# Patient Record
Sex: FEMALE | Race: WHITE | NOT HISPANIC OR LATINO
[De-identification: names, ages, dates, MRNs, and addresses within clinical notes are randomized per-mention and may not be internally consistent; named-entity substitution may affect disease eponyms.]

---

## 2021-03-25 PROBLEM — Z00.00 ENCOUNTER FOR PREVENTIVE HEALTH EXAMINATION: Status: ACTIVE | Noted: 2021-03-25

## 2022-08-03 ENCOUNTER — RESULT REVIEW (OUTPATIENT)
Age: 52
End: 2022-08-03

## 2022-09-16 ENCOUNTER — APPOINTMENT (OUTPATIENT)
Dept: PULMONOLOGY | Facility: CLINIC | Age: 52
End: 2022-09-16

## 2022-09-16 ENCOUNTER — NON-APPOINTMENT (OUTPATIENT)
Age: 52
End: 2022-09-16

## 2022-09-16 VITALS
HEART RATE: 98 BPM | DIASTOLIC BLOOD PRESSURE: 58 MMHG | TEMPERATURE: 97.1 F | HEIGHT: 67 IN | OXYGEN SATURATION: 94 % | BODY MASS INDEX: 45.99 KG/M2 | SYSTOLIC BLOOD PRESSURE: 102 MMHG | WEIGHT: 293 LBS

## 2022-09-16 DIAGNOSIS — Z86.79 PERSONAL HISTORY OF OTHER DISEASES OF THE CIRCULATORY SYSTEM: ICD-10-CM

## 2022-09-16 DIAGNOSIS — Z78.9 OTHER SPECIFIED HEALTH STATUS: ICD-10-CM

## 2022-09-16 DIAGNOSIS — Z01.811 ENCOUNTER FOR PREPROCEDURAL RESPIRATORY EXAMINATION: ICD-10-CM

## 2022-09-16 DIAGNOSIS — Z82.49 FAMILY HISTORY OF ISCHEMIC HEART DISEASE AND OTHER DISEASES OF THE CIRCULATORY SYSTEM: ICD-10-CM

## 2022-09-16 DIAGNOSIS — Z86.39 PERSONAL HISTORY OF OTHER ENDOCRINE, NUTRITIONAL AND METABOLIC DISEASE: ICD-10-CM

## 2022-09-16 DIAGNOSIS — Z80.42 FAMILY HISTORY OF MALIGNANT NEOPLASM OF PROSTATE: ICD-10-CM

## 2022-09-16 DIAGNOSIS — Z80.7 FAMILY HISTORY OF OTHER MALIGNANT NEOPLASMS OF LYMPHOID, HEMATOPOIETIC AND RELATED TISSUES: ICD-10-CM

## 2022-09-16 DIAGNOSIS — Z23 ENCOUNTER FOR IMMUNIZATION: ICD-10-CM

## 2022-09-16 DIAGNOSIS — Z80.0 FAMILY HISTORY OF MALIGNANT NEOPLASM OF DIGESTIVE ORGANS: ICD-10-CM

## 2022-09-16 PROCEDURE — 99204 OFFICE O/P NEW MOD 45 MIN: CPT

## 2022-09-16 RX ORDER — LISINOPRIL AND HYDROCHLOROTHIAZIDE TABLETS 10; 12.5 MG/1; MG/1
10-12.5 TABLET ORAL
Refills: 0 | Status: ACTIVE | COMMUNITY

## 2022-09-20 PROBLEM — Z01.811 PREPROCEDURAL RESPIRATORY EXAMINATION: Status: ACTIVE | Noted: 2022-09-20

## 2022-09-20 RX ORDER — CLOTRIMAZOLE AND BETAMETHASONE DIPROPIONATE 10; .5 MG/G; MG/G
1-0.05 CREAM TOPICAL
Qty: 60 | Refills: 0 | Status: ACTIVE | COMMUNITY
Start: 2022-08-15

## 2022-09-20 RX ORDER — LISINOPRIL 5 MG/1
5 TABLET ORAL
Qty: 90 | Refills: 0 | Status: ACTIVE | COMMUNITY
Start: 2022-08-15

## 2022-09-20 RX ORDER — CLARITHROMYCIN 500 MG/1
500 TABLET, FILM COATED ORAL
Qty: 28 | Refills: 0 | Status: ACTIVE | COMMUNITY
Start: 2022-08-08

## 2022-09-20 RX ORDER — LANSOPRAZOLE 30 MG/1
30 CAPSULE, DELAYED RELEASE ORAL
Qty: 28 | Refills: 0 | Status: ACTIVE | COMMUNITY
Start: 2022-08-08

## 2022-09-20 RX ORDER — METRONIDAZOLE 500 MG/1
500 TABLET ORAL
Qty: 28 | Refills: 0 | Status: ACTIVE | COMMUNITY
Start: 2022-08-08

## 2022-09-20 NOTE — REASON FOR VISIT
[Initial] : an initial visit [Sleep Apnea] : sleep apnea [TextBox_44] : is presenting for medical clearance for bariatric surgery 096/28/2022.  Patient has  no pulmonary complaints.  Patient had a home sleep study done.

## 2022-09-20 NOTE — HISTORY OF PRESENT ILLNESS
[Never] : never [TextBox_4] : 9/16/22  The patient is to have a  gastric sleeve She has gained and loss a hundred lbs  3 x in her life.  The patient regained the weight b/o a grandson that was premature and born with multiple problems ans eventually passed  The patient has had a sleep study and has a BIPaP machine pending  She has never been tested before The patient has hypersomnolence, daytime somnolence, headaches in the daytime, lack of restorative sleep

## 2022-09-20 NOTE — PHYSICAL EXAM
[No Acute Distress] : no acute distress [Normal Oropharynx] : normal oropharynx [Normal Appearance] : normal appearance [No Neck Mass] : no neck mass [Normal Rate/Rhythm] : normal rate/rhythm [Normal S1, S2] : normal s1, s2 [No Murmurs] : no murmurs [No Resp Distress] : no resp distress [Clear to Auscultation Bilaterally] : clear to auscultation bilaterally [No Abnormalities] : no abnormalities [Benign] : benign [Normal Gait] : normal gait [No Clubbing] : no clubbing [No Cyanosis] : no cyanosis [No Edema] : no edema [FROM] : FROM [Normal Color/ Pigmentation] : normal color/ pigmentation [No Focal Deficits] : no focal deficits [Oriented x3] : oriented x3 [Normal Affect] : normal affect [TextBox_2] : Bmi 50

## 2022-09-20 NOTE — REVIEW OF SYSTEMS
[Fatigue] : fatigue [Postnasal Drip] : postnasal drip [Diarrhea] : diarrhea [Arthralgias] : arthralgias [Myalgias] : myalgias [Back Pain] : back pain [Headache] : headache [Diabetes] : diabetes [Obesity] : obesity [Nasal Congestion] : no nasal congestion [Sinus Problems] : no sinus problems [Cough] : no cough [Sputum] : no sputum [Wheezing] : no wheezing [Seasonal Allergies] : no seasonal allergies [GERD] : no gerd [Constipation] : no constipation [Dysphagia] : no dysphagia [Hepatic Disease] : no hepatic disease [Depression] : no depression [Anxiety] : no anxiety [Panic Attacks] : no panic attacks [Negative] : Respiratory [TextBox_44] : (+) HTN [TextBox_69] : no ETOH [TextBox_122] : am headaches [TextBox_144] : on Vit D def   borderline DM

## 2022-09-20 NOTE — ASSESSMENT
[FreeTextEntry1] : 9/16/2022 Mariama Mejia is a 52-year-old female with massive morbid obesity.  The patient BMI 50 and is more than 100 pounds overweight.  The patient states that she is gained and lost 100 pounds 3 times.  The patient at this time does not believe that she can lose the weight again without bariatric surgery.  The patient has discussed with her doctors and is scheduled for a partial gastric resection under general anesthesia.  Patient has already been assessed for sleep apnea and is on assistive positive pressure.  The patient had pulmonary function test here in the office and a pulmonary function tests are normal.  At this time there is no contraindication for the proposed partial gastric resection under general anesthesia or increased pulmonary risks.  Patient has sleep apnea and standard sleep apnea anesthetic protocol should be observed.  Time spent 45 minutes  counseling, education, documentation, imaging reviewed, medication reviewed, old records reviewed, HX and PE